# Patient Record
Sex: MALE | Race: WHITE | Employment: OTHER | URBAN - METROPOLITAN AREA
[De-identification: names, ages, dates, MRNs, and addresses within clinical notes are randomized per-mention and may not be internally consistent; named-entity substitution may affect disease eponyms.]

---

## 2017-08-25 PROBLEM — R73.03 DIABETES MELLITUS, LATENT: Status: ACTIVE | Noted: 2017-08-25

## 2020-06-01 PROBLEM — M21.371 BILATERAL FOOT-DROP: Status: ACTIVE | Noted: 2020-06-01

## 2020-06-01 PROBLEM — M21.372 BILATERAL FOOT-DROP: Status: ACTIVE | Noted: 2020-06-01

## 2020-06-01 PROBLEM — G62.89 PERIPHERAL MOTOR NEUROPATHY: Status: ACTIVE | Noted: 2020-06-01

## 2020-06-01 PROBLEM — G12.20: Status: ACTIVE | Noted: 2020-06-01

## 2020-06-09 ENCOUNTER — HOSPITAL ENCOUNTER (OUTPATIENT)
Dept: CT IMAGING | Age: 55
Discharge: HOME | End: 2020-06-09
Attending: PSYCHIATRY & NEUROLOGY
Payer: COMMERCIAL

## 2020-06-09 DIAGNOSIS — G12.21 ALS (AMYOTROPHIC LATERAL SCLEROSIS) (HCC): ICD-10-CM

## 2020-06-09 DIAGNOSIS — R63.4 WEIGHT LOSS: ICD-10-CM

## 2020-06-09 PROCEDURE — 74011636320 HC RX REV CODE- 636/320: Performed by: PSYCHIATRY & NEUROLOGY

## 2020-06-09 PROCEDURE — 74011000258 HC RX REV CODE- 258: Performed by: PSYCHIATRY & NEUROLOGY

## 2020-06-09 PROCEDURE — 74177 CT ABD & PELVIS W/CONTRAST: CPT

## 2020-06-09 RX ORDER — SODIUM CHLORIDE 0.9 % (FLUSH) 0.9 %
10 SYRINGE (ML) INJECTION
Status: COMPLETED | OUTPATIENT
Start: 2020-06-09 | End: 2020-06-09

## 2020-06-09 RX ADMIN — SODIUM CHLORIDE 100 ML: 900 INJECTION, SOLUTION INTRAVENOUS at 08:56

## 2020-06-09 RX ADMIN — IOPAMIDOL 100 ML: 755 INJECTION, SOLUTION INTRAVENOUS at 08:56

## 2020-06-09 RX ADMIN — DIATRIZOATE MEGLUMINE AND DIATRIZOATE SODIUM 15 ML: 660; 100 LIQUID ORAL; RECTAL at 08:56

## 2020-06-09 RX ADMIN — Medication 10 ML: at 08:56

## 2020-06-09 NOTE — PROGRESS NOTES
Could you check if referral to [facility] neurology, neuromuscular specialist was faxed, together with my EMG note, CT reports. thx    Mr Nataly Munoz,     Your body CT including chest, abdomen and pelvis was in normal range, no evidence of malignancy. Have you received appt from [facility] neurology department? We will send results over.

## 2020-06-10 ENCOUNTER — HOSPITAL ENCOUNTER (OUTPATIENT)
Dept: PHYSICAL THERAPY | Age: 55
Discharge: HOME | End: 2020-06-10
Payer: COMMERCIAL

## 2020-06-10 DIAGNOSIS — M21.372 BILATERAL FOOT-DROP: ICD-10-CM

## 2020-06-10 DIAGNOSIS — M21.371 BILATERAL FOOT-DROP: ICD-10-CM

## 2020-06-10 DIAGNOSIS — M62.81 MUSCLE WEAKNESS: ICD-10-CM

## 2020-06-10 PROCEDURE — 97162 PT EVAL MOD COMPLEX 30 MIN: CPT

## 2020-06-10 PROCEDURE — 97110 THERAPEUTIC EXERCISES: CPT

## 2020-06-11 NOTE — THERAPY EVALUATION
Denton Fajardo  : 1965  Primary: Marbella [facility]*  Secondary:  Therapy Center at [address], [place], [address] [place] [address]  [phone]   [phone]       OUTPATIENT PHYSICAL THERAPY:Initial Assessment 2020    ICD-10: Treatment Diagnosis: M62.81 muscle weakness                Treatment Diagnosis 2:M21.371 and M21.372 bilateral foot drop                 Treatment Diagnosis 3: R26.89 other abnormalities of gait and mobility   Precautions: falls precautions/weak dorsiflexors /foot drop   Allergies: Patient has no known allergies. TREATMENT PLAN:  Effective Dates: 6/10/2020 TO 8/10/2020 (60 days).     Frequency/Duration: 2 times a week for 60 Day(s) MEDICAL/REFERRING DIAGNOSIS:  Muscle weakness [M62.81]  Bilateral foot-drop [M21.371, M21.372]   DATE OF ONSET: 2020-insidious numbness and weakness in L foot   REFERRING PHYSICIAN: Lexi Kunz MD MD Orders: Evaluate and Treat  Return MD Appointment: unsure     INITIAL ASSESSMENT:  Mr. Pramod Wilson is a 60 y.o. male presenting to physical therapy with complaints of insidious progressive LE weakness with muscle loss and difficulty walking since 2020-initial L foot weakness and numbness since 2020 after bike riding  has progressed to R LE as well with noted L biceps fasiculations -pain level is 0/10 with his main complaint of weakness and lack of mobility/walking ability -patient ambulates into dept using a cane with noted lack of control with L foot -very weak bilateral dorsiflexors causing a foot drop situation where he will benefit from bilateral AFO's-full mobility in LE and UE but decreased foot and quad strength-noted muscle atrophy in glutes and quads with patient stating he has lost 27 lbs and gone from a 37 inch waist to 32 -more weak to L LE than right -sensation intact per patient/just motor issues -UE strength is wfl and cervical mobility is normal -lumbar mobility is wfl with stand by guard to prevent falling -history of falling /LE buckling per patient - -very good candidate for skilled physical therapy to include therapeutic exercises and gait training with lower leg bracing -will contact orthotist for LE bracing -   PROBLEM LIST (Impacting functional limitations):  1. Decreased Strength  2. Decreased ADL/Functional Activities  3. Decreased Transfer Abilities  4. Decreased Ambulation Ability/Technique  5. Decreased Balance  6. Decreased Activity Tolerance  7. Decreased Pacing Skills  8. Increased Fatigue INTERVENTIONS PLANNED: (Treatment may consist of any combination of the following)  1. Therapeutic Exercise/Strengthening     GOALS: (Goals have been discussed and agreed upon with patient.)  Short-Term Functional Goals: Time Frame: 6/10/2020 to 6/24/2020  1. Patient demonstrates independence with home exercise program without verbal cueing provided by therapist.  2. LE strengthening pushing quads/dorsiflexors to promote safer gait   3. Falls precautions cues instructed to patient /proper use of cane   4. Set up evaluation with orthotist for AFO's  5. Instruction in energy conserving practices   Discharge Goals: Time Frame: 6/10/2020 to 8/10/2020  1. Improved safety with gait to allow minimal to no falling   2. Ambulates with cane and orthotics with minimal antalgic gait to allow walking community distances   3. Patient to have looked into second opinion at Dr. Ada Butler  4. Consistent with LE exercises to decrease muscle atrophy and maintain and improve function and gait    5. Dorsiflexor strength to at least 3/5 and quad strength at least 3+/5  6. LEFS score is improved from 7/80 to 18/80    Outcome Measure:    Tool Used: Lower Extremity Functional Scale (LEFS)  Score:  Initial: 7/80 Most Recent: X/80 (Date: -- )   Interpretation of Score: 20 questions each scored on a 5 point scale with 0 representing \"extreme difficulty or unable to perform\" and 4 representing \"no difficulty\". The lower the score, the greater the functional disability. 80/80 represents no disability. Minimal detectable change is 9 points. Medical Necessity:   · Patient is expected to demonstrate progress in strength, balance and coordination to increase independence with ADLs and improve safety during walking and transfers. · Skilled intervention continues to be required due to LE weakness is affecting function and gait . Reason for Services/Other Comments:  · Patient continues to require modification of therapeutic interventions to increase complexity of exercises. Total Evaluation Duration: 40 minutes    Rehabilitation Potential For Stated Goals: Fair-depending on progression of disease  Regarding Gilford Croft FABIAN Luis Miguel's therapy, I certify that the treatment plan above will be carried out by a therapist or under their direction. Thank you for this referral,  Moraima Vogt PT     Referring Physician Signature: Elpidio Johnson MD              Date                     PAIN/SUBJECTIVE:    Initial: Pain Intensity 1: 0  Pain Location 1: Ankle, Foot, Leg(weakness in feet and legs)  Pain Orientation 1: Left, Right  Pain Intervention(s) 1: Exercise  Post Session:  0/10 -fatigued from exercises     HISTORY:    History of Injury/Illness (Reason for Referral): Insidious L foot weakness and numbness has progressed to R  LE as well -very weak dorsiflexors with foot drop-weak quads-falls precautions   Past Medical History/Comorbidities:   Mr. 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    Viviana Azevedo  has a past surgical history that includes hx colonoscopy (2014) and hx urological.  Social History/Living Environment:   Home Environment: Private residence  # Steps to Enter: 2  Rails to Enter: Yes  Wheelchair Ramp: No  One/Two Story Residence: (3 story-lives on main floor)  Living Alone: Yes  Support Systems: Family member(s)  Patient Expects to be Discharged to[de-identified] Private residence  Current DME Used/Available at Home: Cane, straight  Tub or Shower Type: Tub/Shower combination  Prior Level of Function/Work/Activity:  Independent with home and job duties and walking prior to Feb/2020    Ambulatory/Rehab Services H2 Model Falls Risk Assessment    Risk Factors:       (1)  Gender [Male]       (1)  Any administered benzodiazepines Ability to Rise from Chair:       (1)  Pushes up, successful in one attempt    Falls Prevention Plan: Mobility Assistance Device (specify):  needs cane and LE orthotics     Total: (5 or greater = High Risk): 3    ©2010 [facility] [address] [place] [address] Patent #[id number]. Federal Law prohibits the replication, distribution or use without written permission from [facility] [employer]    Current Medications:        Current Outpatient Medications:     LORazepam (ATIVAN) 0.5 mg tablet, 1/2 to 1 tablet TID prn stress, Disp: 90 Tab, Rfl: 1    DISABLED PLACARD (DISABLED PLACARD) [place] Placard and/or License Plate, Disp: 1 Each, Rfl: 0    ezetimibe (ZETIA) 10 mg tablet, Take 1 Tab by mouth daily. , Disp: 90 Tab, Rfl: 3    sertraline (ZOLOFT) 50 mg tablet, Take 1 Tab by mouth daily. , Disp: 90 Tab, Rfl: 3    atorvastatin (LIPITOR) 20 mg tablet, TAKE 1 TABLET BY MOUTH EVERY DAY, Disp: 90 Tab, Rfl: 2    tadalafil (CIALIS) 20 mg tablet, Take 1 Tab by mouth as needed (ED)., Disp: 10 Tab, Rfl: 12    sildenafil citrate (VIAGRA) 100 mg tablet, Take 1 Tab by mouth as needed (ed)., Disp: 30 Tab, Rfl: 11    levothyroxine (SYNTHROID) 137 mcg tablet, TAKE 1 TABLET BY MOUTH DAILY, Disp: 90 Tab, Rfl: 0    esomeprazole (NEXIUM) 40 mg capsule, Take 1 Cap by mouth daily. , Disp: 90 Cap, Rfl: 3    Date Last Reviewed:  6/11/2020    Number of Personal Factors/Comorbidities that affect the Plan of Care-history of hypothyroidism, motor neuron disease, and obsessive compulsive disorder : 1-2: MODERATE COMPLEXITY    EXAMINATION:    Observation/Orthostatic Postural Assessment:          Patient ambulates with cane and moderate antalgic gait with mild rounded shouldwrs and forward head on neck posture -decreased control of L foot with gait   Palpation:        Non tender to palpation -noted muscle atrophy in glutes and quads   ROM:        LE range is wfl and UE range is wfl -passive range of motion is wfl   Strength:        L dorsiflexors -3-/5       L plantarflexors-3 to 3+/5      L evertors-3- to 3/5      L invertors- 3 to 3+/5     R dorsiflexors-3-/5   R plantarflexors-3 to 3+/5  R evertor-3 to 3+/5  R invertors-3 to 3+/5     L quads-3+/5  L hamstrings-3+ to 4-/5    R quads-4-/5  R hamstrings-4-/5    L hip flexor-4 to 4+/5  R hip flexion-4 to 4+/5      Special Tests:         Negative homans sign       Neurological Screen:    Sensation intact to light touch in lower legs - (attempts at stimulatiing L anterior tibialis with electrical muscle stimulation evoked no dorsiflexion -but patient felt tingling )  Skin Integrity:        clear            Body Structures Involved:  1. Nerves  2. Bones  3. Joints  4. Muscles Body Functions Affected:  1. Neuromusculoskeletal  2. Movement Related Activities and Participation Affected:  1. Learning and Applying Knowledge  2. General Tasks and Demands  3. Mobility  4. Self Care  5.  Domestic Life    Number of elements (examined above) that affect the Plan of Care: 3: MODERATE COMPLEXITY    CLINICAL PRESENTATION:    Presentation: Evolving clinical presentation with changing clinical characteristics: MODERATE COMPLEXITY    CLINICAL DECISION MAKING: Use of outcome tool(s) and clinical judgement create a POC that gives a-impairment of at least 80% but less than 100%:  Questionable prediction of patient's progress: MODERATE COMPLEXITY

## 2020-06-11 NOTE — PROGRESS NOTES
Ana Paula Garnett  : 1965  Primary: [facility] Deann*  Secondary:  Therapy Center at [facility]  [address], [place], [address] [place] [address] Street  [phone]   [phone]      OUTPATIENT PHYSICAL THERAPY: Daily Treatment Note 6/10/2020    ICD-10: Treatment Diagnosis: M62.81 muscle weakness                Treatment Diagnosis 2:M21.371 and M21.372 bilateral foot drop                Treatment Diagnosis 3: R26.89 other abnormalities of gait and mobility   Precautions: falls precautions-weak dorsiflexors with foot drop  Allergies: Patient has no known allergies. TREATMENT PLAN:  Effective Dates: 6/10/2020 TO 8/10/2020 (60 days). Frequency/Duration: 2 times a week for 60 Day(s) MEDICAL/REFERRING DIAGNOSIS:  Muscle weakness [M62.81]  Bilateral foot-drop [M21.371, M21.372]   DATE OF ONSET: insidious L foot numbness and weakness /progresssed to R LE as well since   REFERRING PHYSICIAN: Courtney Durbin MD MD Orders: Evaluate and Treat   Return MD Appointment: unsure     Pre-treatment Symptoms/Complaints: -I have been losing my ability to walk very well as I have a lot of weakness in my feet and legs and I have lost almost 30 lbs with muscle atrophy-My doctor says I have 2 of the 3 signs for ALS so I am afraid I may have a death sentence-My doctor suggested I go to [place] to get further tests and she would send all her reports to them    Pain: Initial: Pain Intensity 1: 0  Pain Location 1: Ankle, Foot, Leg(weakness in feet and legs)  Pain Orientation 1: Left, Right  Pain Intervention(s) 1: Exercise  Post Session:  0/10-no pain just weakness   Medications Last Reviewed:  2020  Updated Objective Findings:  See evaluation note from today   TREATMENT:   THERAPEUTIC EXERCISE: (25 minutes):  Exercises per grid below to improve strength, balance and coordination.   Required minimal verbal cues to promote proper body alignment and promote proper body posture. Progressed resistance, range, repetitions and complexity of movement as indicated. Date:  6/10/2020 Date:   Date:     Activity/Exercise Parameters Parameters Parameters   Calf stretch 2 x 30 seconds to each LE by therapist     Hamstring stretch 2 x 30 seconds with belt     Ankle pumps 2 x 20     Ankle eversion X 10 with yellow band     Ankle circles reviewed     Straight leg raises X 10     Long arc quads 2 x 10 with 2 lbs     Seated hamstring curls 2 x 10 with blue band     Hip adduction X 10 with theraball     Sit to stand 2 x 5     Gluteal sets X 10 with 3 second hold                   Time spent with patient reviewing proper muscle recruitment and technique with exercises. MANUAL THERAPY: (0 minutes): no manual therapy  was utilized and necessary because of the patient's -no pain or tightness        MODALITIES: (5 minutes):      Electrical muscle stimulation to L anterior tibialis produced no motor movement     HEP: As above; handouts given to patient for all exercises. Treatment/Session Summary:    · Response to Treatment:  performed exercises with good effort-monitor fatigue.-patient continually spoke of ALS/death sentence so to try to keep him more positive it was suggested there may be other reasons for his loss of LE motor control including CIDP/Guillain [place]  · Communication/Consultation:  HEP  · Equipment provided today:  theraband for home usage  · Recommendations/Intent for next treatment session: Next visit will focus on LE strengthening pushing dorsiflexors and quads as tolerated/gait training/balance exercises-schedule for orthotist for AFO's .     Total Treatment Billable Duration: evaluation x 40 minutes/exercises x 25 minutes =65 minutes   PT Patient Time In/Time Out  Time In: 1004  Time Out: [address]  [place] PT

## 2020-06-23 ENCOUNTER — HOSPITAL ENCOUNTER (OUTPATIENT)
Dept: PHYSICAL THERAPY | Age: 55
Discharge: HOME | End: 2020-06-23
Payer: COMMERCIAL

## 2020-06-23 PROCEDURE — 97110 THERAPEUTIC EXERCISES: CPT

## 2020-06-23 NOTE — PROGRESS NOTES
Eleanor Balbuena  : 1965  Primary: Onetha Hiss Of Rajesh Zacari[facility]arol*  Secondary:  Therapy Center at [address], [place], [address] [place] [address]  [phone]   [phone]      OUTPATIENT PHYSICAL THERAPY: Daily Treatment Note 2020    ICD-10: Treatment Diagnosis: M62.81 muscle weakness                Treatment Diagnosis 2:M21.371 and M21.372 bilateral foot drop                Treatment Diagnosis 3: R26.89 other abnormalities of gait and mobility   Precautions: falls precautions-weak dorsiflexors with foot drop  Allergies: Patient has no known allergies. TREATMENT PLAN:  Effective Dates: 6/10/2020 TO 8/10/2020 (60 days). Frequency/Duration: 2 times a week for 60 Day(s) MEDICAL/REFERRING DIAGNOSIS:  Muscle weakness (generalized) [M62.81]  Foot drop, right foot [M21.371]  Foot drop, left foot [M21.372]   DATE OF ONSET: insidious L foot numbness and weakness /progresssed to R LE as well since   REFERRING PHYSICIAN: Jeannine Caraballo MD MD Orders: Evaluate and Treat   Return MD Appointment: unsure     Pre-treatment Symptoms/Complaints: -I have been getting second opinions and am planning to go to [facility] -I am not hurting just weakness in lower extremities and particularly L foot   Pain: Initial: Pain Location 1: Ankle, Foot, Leg  Pain Orientation 1: Left, Right  Pain Intervention(s) 1: Exercise  Post Session:  0/10-no pain just weakness   Medications Last Reviewed:  2020  Updated Objective Findings: performed exercises well    TREATMENT:   THERAPEUTIC EXERCISE: (55 minutes):  Exercises per grid below to improve strength, balance and coordination. Required minimal verbal cues to promote proper body alignment and promote proper body posture. Progressed resistance, range, repetitions and complexity of movement as indicated.      Date:  6/10/2020 Date:  20 Date:     Activity/Exercise Parameters Parameters Parameters   Calf stretch 2 x 30 seconds to each LE by therapist 8 x 30 seconds to each LE     Hamstring stretch 2 x 30 seconds with belt 4 x 30 seconds to each LE     Ankle pumps 2 x 20 2 x 10 to each ankle with assist in dorsiflexion    Ankle plantarflexion  2 x 10 to each LE with yellow band    Ankle inversion  2 x 10 to each LE with yellow band    Ankle eversion X 10 with yellow band 2 x 10 to each LE with yellow band with assist on L foot    Ankle circles reviewed     Straight leg raises X 10 2 x 10 to each LE with 1.5 lb    Long arc quads 2 x 10 with 2 lbs 2 x 10 to each LE with 3 lbs    Seated hamstring curls 2 x 10 with blue band 2 x 10 to each LE with blue band    Hip adduction X 10 with theraball X 20 with yellow theraball    Sit to stand 2 x 5 X 10    Gluteal sets X 10 with 3 second hold     Seated hamstring curls  2 x 10 to each LE with blue band    Standing hip abduction  2 x 10 to each LE with 3 lbs    Standing hip flexion  2 x 10 to each LE with 3 lbs    Standing hip extension  2 x 10 to each LE with 3 lbs    Nu step  X 5 minutes at level 2     Marching in place   2 x 10 to each LE with 3 lbs       Time spent with patient reviewing proper muscle recruitment and technique with exercises. MANUAL THERAPY: (0 minutes): no manual therapy  was utilized and necessary because of the patient's -no pain or tightness        MODALITIES: (0 minutes):      Electrical muscle stimulation to L anterior tibialis produced no motor movement -not today    HEP: As above; handouts given to patient for all exercises.     Treatment/Session Summary:    · Response to Treatment:  performed exercises with good effort-monitor fatigue.-patient continually spoke of ALS but now hopes he has CIDP-patient was directed to orthotist about AFO's-patient has smoother gait with use of cane   · Communication/Consultation:  HEP  · Equipment provided today:  theraband for home usage  · Recommendations/Intent for next treatment session: Next visit will focus on LE strengthening pushing dorsiflexors and quads as tolerated/gait training/balance exercises-schedule for orthotist for AFO's .     Total Treatment Billable Duration: 55 minutes    PT Patient Time In/Time Out  Time In: 1630  Time Out: [address], PT

## 2020-06-30 ENCOUNTER — APPOINTMENT (OUTPATIENT)
Dept: PHYSICAL THERAPY | Age: 55
End: 2020-06-30
Payer: COMMERCIAL

## 2020-06-30 NOTE — THERAPY DISCHARGE
Norman Del Vallels  : 1965  Primary: [facility]  Secondary:  [facility]  [address] [place] [address], [place], [address] [place] [address]  [phone]   [phone]       OUTPATIENT PHYSICAL [facility] 2020    ICD-10: Treatment Diagnosis: M62.81 muscle weakness                Treatment Diagnosis 2:M21.371 and M21.372 bilateral foot drop                 Treatment Diagnosis 3: R26.89 other abnormalities of gait and mobility   Precautions: falls precautions/weak dorsiflexors /foot drop   Allergies: Patient has no known allergies. TREATMENT PLAN:  Effective Dates: 6/10/2020 TO 8/10/2020 (60 days).     Frequency/Duration: 2 times a week for 60 Day(s) MEDICAL/REFERRING DIAGNOSIS:  Muscle weakness (generalized) [M62.81]  Foot drop, right foot [M21.371]  Foot drop, left foot [M21.372]   DATE OF ONSET: 2020-insidious numbness and weakness in L foot   REFERRING PHYSICIAN: Elpidio Johnson MD MD Orders: Evaluate and Treat  Return MD Appointment: unsure     INITIAL ASSESSMENT:  Mr. João Hogan is a 60 y.o. male presenting to physical therapy with complaints of insidious progressive LE weakness with muscle loss and difficulty walking since 2020-initial L foot weakness and numbness since 2020 after bike riding  has progressed to R LE as well with noted L biceps fasiculations -pain level is 0/10 with his main complaint of weakness and lack of mobility/walking ability -patient ambulates into dept using a cane with noted lack of control with L foot -very weak bilateral dorsiflexors causing a foot drop situation where he will benefit from bilateral AFO's-full mobility in LE and UE but decreased foot and quad strength-noted muscle atrophy in glutes and quads with patient stating he has lost 27 lbs and gone from a 37 inch waist to 32 -more weak to L LE than right -sensation intact per patient/just motor issues -UE strength is wfl and cervical mobility is normal -lumbar mobility is wfl with stand by guard to prevent falling -history of falling /LE buckling per patient - -very good candidate for skilled physical therapy to include therapeutic exercises and gait training with lower leg bracing -will contact orthotist for LE bracing -    Patient was seen for 2 visits of rehab from 6- to 6- with 4 cancellations -patient called and stated he was very busy with his schedule to see multiple physicians about his condition and he felt as  he had a  to assist him with his exercise program he felt he would discontinue therapy at this time -pleasant gentleman to work with -DC to home program at this time     PROBLEM LIST (Impacting functional limitations):  1. Decreased Strength  2. Decreased ADL/Functional Activities  3. Decreased Transfer Abilities  4. Decreased Ambulation Ability/Technique  5. Decreased Balance  6. Decreased Activity Tolerance  7. Decreased Pacing Skills  8. Increased Fatigue INTERVENTIONS PLANNED: (Treatment may consist of any combination of the following)  1. Therapeutic Exercise/Strengthening     GOALS: (Goals have been discussed and agreed upon with patient.)  Short-Term Functional Goals: Time Frame: 6/10/2020 to 6/24/2020  1. Patient demonstrates independence with home exercise program without verbal cueing provided by therapist.  2. LE strengthening pushing quads/dorsiflexors to promote safer gait   3. Falls precautions cues instructed to patient /proper use of cane   4. Set up evaluation with orthotist for AFO's  5. Instruction in energy conserving practices   Discharge Goals: Time Frame: 6/10/2020 to 8/10/2020  1. Improved safety with gait to allow minimal to no falling   2. Ambulates with cane and orthotics with minimal antalgic gait to allow walking community distances   3. Patient to have looked into second opinion at Dr. Roderick Simms  4.  Consistent with LE exercises to decrease muscle atrophy and maintain and improve function and gait    5. Dorsiflexor strength to at least 3/5 and quad strength at least 3+/5  6. LEFS score is improved from 7/80 to 18/80    GOALS WERE NOT MET AS PATIENT WAS ONLY ABLE  CAME TO THERAPY FOR 2 VISITS      Outcome Measure: Tool Used: Lower Extremity Functional Scale (LEFS)  Score:  Initial: 7/80 Most Recent: X/80 (Date: -- )   Interpretation of Score: 20 questions each scored on a 5 point scale with 0 representing \"extreme difficulty or unable to perform\" and 4 representing \"no difficulty\". The lower the score, the greater the functional disability. 80/80 represents no disability. Minimal detectable change is 9 points.   Observation/Orthostatic Postural Assessment:          Patient ambulates with cane and moderate antalgic gait with mild rounded shouldwrs and forward head on neck posture -decreased control of L foot with gait   Palpation:        Non tender to palpation -noted muscle atrophy in glutes and quads   ROM:        LE range is wfl and UE range is wfl -passive range of motion is wfl   Strength:        L dorsiflexors -3-/5       L plantarflexors-3 to 3+/5      L evertors-3- to 3/5      L invertors- 3 to 3+/5     R dorsiflexors-3-/5   R plantarflexors-3 to 3+/5  R evertor-3 to 3+/5  R invertors-3 to 3+/5     L quads-3+/5  L hamstrings-3+ to 4-/5    R quads-4-/5  R hamstrings-4-/5    L hip flexor-4 to 4+/5  R hip flexion-4 to 4+/5      Special Tests:         Negative homans sign       Neurological Screen:    Sensation intact to light touch in lower legs - (attempts at stimulatiing L anterior tibialis with electrical muscle stimulation evoked no dorsiflexion -but patient felt tingling )  Skin Integrity:        clear      Medical Necessity:   · DC to home exercise program  Reason for Services/Other Comments:  · DC to home program      Rehabilitation Potential For Stated Goals: Fair-depending on progression of disease  Regarding Suman Kaplan Luis Miguel's therapy, I certify that the treatment plan above will be carried out by a therapist or under their direction.   Thank you for this referral,  Yomaira Fernandez, PT                  PAIN/SUBJECTIVE:

## 2020-07-01 ENCOUNTER — APPOINTMENT (OUTPATIENT)
Dept: PHYSICAL THERAPY | Age: 55
End: 2020-07-01

## 2020-07-07 ENCOUNTER — APPOINTMENT (OUTPATIENT)
Dept: PHYSICAL THERAPY | Age: 55
End: 2020-07-07

## 2020-07-08 ENCOUNTER — APPOINTMENT (OUTPATIENT)
Dept: PHYSICAL THERAPY | Age: 55
End: 2020-07-08

## 2020-07-20 PROBLEM — G12.21 ALS (AMYOTROPHIC LATERAL SCLEROSIS) (HCC): Status: ACTIVE | Noted: 2020-07-20
